# Patient Record
Sex: MALE | Race: WHITE | Employment: FULL TIME | URBAN - METROPOLITAN AREA
[De-identification: names, ages, dates, MRNs, and addresses within clinical notes are randomized per-mention and may not be internally consistent; named-entity substitution may affect disease eponyms.]

---

## 2017-06-27 PROBLEM — M54.50 RIGHT-SIDED LOW BACK PAIN WITHOUT SCIATICA, UNSPECIFIED CHRONICITY: Status: ACTIVE | Noted: 2017-06-27

## 2017-10-13 PROBLEM — L65.9 ALOPECIA: Status: ACTIVE | Noted: 2017-10-13

## 2018-03-12 PROBLEM — M54.50 RIGHT-SIDED LOW BACK PAIN WITHOUT SCIATICA, UNSPECIFIED CHRONICITY: Status: RESOLVED | Noted: 2017-06-27 | Resolved: 2018-03-12

## 2018-03-27 ENCOUNTER — HOSPITAL ENCOUNTER (EMERGENCY)
Facility: HOSPITAL | Age: 38
Discharge: HOME | End: 2018-03-27
Attending: EMERGENCY MEDICINE
Payer: COMMERCIAL

## 2018-03-27 ENCOUNTER — APPOINTMENT (OUTPATIENT)
Dept: GENERAL RADIOLOGY | Facility: HOSPITAL | Age: 38
End: 2018-03-27
Attending: EMERGENCY MEDICINE
Payer: COMMERCIAL

## 2018-03-27 VITALS
OXYGEN SATURATION: 96 % | DIASTOLIC BLOOD PRESSURE: 72 MMHG | RESPIRATION RATE: 20 BRPM | SYSTOLIC BLOOD PRESSURE: 108 MMHG | WEIGHT: 180 LBS | HEART RATE: 86 BPM | HEIGHT: 73 IN | TEMPERATURE: 98 F | BODY MASS INDEX: 23.86 KG/M2

## 2018-03-27 DIAGNOSIS — B34.9 VIRAL SYNDROME: Primary | ICD-10-CM

## 2018-03-27 LAB
ANION GAP SERPL CALC-SCNC: 10 MMOL/L (ref 0–18)
BASOPHILS # BLD: 0 K/UL (ref 0–0.2)
BASOPHILS NFR BLD: 0 %
BUN SERPL-MCNC: 21 MG/DL (ref 8–20)
BUN/CREAT SERPL: 16.8 (ref 10–20)
CALCIUM SERPL-MCNC: 9.3 MG/DL (ref 8.5–10.5)
CHLORIDE SERPL-SCNC: 96 MMOL/L (ref 95–110)
CO2 SERPL-SCNC: 28 MMOL/L (ref 22–32)
CREAT SERPL-MCNC: 1.25 MG/DL (ref 0.5–1.5)
EOSINOPHIL # BLD: 0.1 K/UL (ref 0–0.7)
EOSINOPHIL NFR BLD: 1 %
ERYTHROCYTE [DISTWIDTH] IN BLOOD BY AUTOMATED COUNT: 12.5 % (ref 11–15)
GLUCOSE SERPL-MCNC: 129 MG/DL (ref 70–99)
HCT VFR BLD AUTO: 46.2 % (ref 41–52)
HGB BLD-MCNC: 16.1 G/DL (ref 13.5–17.5)
LYMPHOCYTES # BLD: 0.8 K/UL (ref 1–4)
LYMPHOCYTES NFR BLD: 11 %
MCH RBC QN AUTO: 32.5 PG (ref 27–32)
MCHC RBC AUTO-ENTMCNC: 34.8 G/DL (ref 32–37)
MCV RBC AUTO: 93.3 FL (ref 80–100)
MONOCYTES # BLD: 0.4 K/UL (ref 0–1)
MONOCYTES NFR BLD: 5 %
NEUTROPHILS # BLD AUTO: 6.1 K/UL (ref 1.8–7.7)
NEUTROPHILS NFR BLD: 83 %
OSMOLALITY UR CALC.SUM OF ELEC: 283 MOSM/KG (ref 275–295)
PLATELET # BLD AUTO: 164 K/UL (ref 140–400)
PMV BLD AUTO: 10.1 FL (ref 7.4–10.3)
POTASSIUM SERPL-SCNC: 4.2 MMOL/L (ref 3.3–5.1)
RBC # BLD AUTO: 4.95 M/UL (ref 4.5–5.9)
SODIUM SERPL-SCNC: 134 MMOL/L (ref 136–144)
WBC # BLD AUTO: 7.4 K/UL (ref 4–11)

## 2018-03-27 PROCEDURE — 80048 BASIC METABOLIC PNL TOTAL CA: CPT

## 2018-03-27 PROCEDURE — 99284 EMERGENCY DEPT VISIT MOD MDM: CPT

## 2018-03-27 PROCEDURE — 71046 X-RAY EXAM CHEST 2 VIEWS: CPT | Performed by: EMERGENCY MEDICINE

## 2018-03-27 PROCEDURE — 85025 COMPLETE CBC W/AUTO DIFF WBC: CPT | Performed by: EMERGENCY MEDICINE

## 2018-03-27 PROCEDURE — 96360 HYDRATION IV INFUSION INIT: CPT

## 2018-03-27 PROCEDURE — 80048 BASIC METABOLIC PNL TOTAL CA: CPT | Performed by: EMERGENCY MEDICINE

## 2018-03-27 PROCEDURE — 85025 COMPLETE CBC W/AUTO DIFF WBC: CPT

## 2018-03-27 RX ORDER — ONDANSETRON 4 MG/1
4 TABLET, ORALLY DISINTEGRATING ORAL EVERY 4 HOURS PRN
Qty: 10 TABLET | Refills: 0 | Status: SHIPPED | OUTPATIENT
Start: 2018-03-27 | End: 2018-04-03

## 2018-03-27 NOTE — ED PROVIDER NOTES
Patient Seen in: [facility] AND [facility] Emergency Department    History   Patient presents with:  Abdomen/Flank Pain (GI/)    Stated Complaint: Abdominal Pain    HPI    61-year-old male with no significant past medical history here with complaints of epigas light-headedness and headaches. All other systems reviewed and are negative. Positive for stated complaint: Abdominal Pain  Other systems are as noted in HPI. Constitutional and vital signs reviewed.       All other systems reviewed and negative exc Sodium 134 (L) 136 - 144 mmol/L   Potassium 4.2 3.3 - 5.1 mmol/L   Chloride 96 95 - 110 mmol/L   CO2 28 22 - 32 mmol/L   BUN 21 (H) 8 - 20 mg/dL   Creatinine 1.25 0.50 - 1.50 mg/dL   Calcium, Total 9.3 8.5 - 10.5 mg/dL   BUN/CREA Ratio 16.8 10.0 - 20.0 records for any recent pertinent discharge summaries, testing, and procedures, and reviewed those reports. Complicating Factors: The patient already has has Alopecia on his problem list. to contribute to the complexity of his ED evaluation.     - pt  com

## 2018-03-27 NOTE — ED NOTES
Pt verbalized having fevers for 4 days. +cough with brownish mucous per pt. Pt was taking ibuprofen and tylenol. At 4:30 pt was woken up by a sharp epigastric pain. + diarrhea since yesterday(5-8 epsidoes). Denied vomiting. + nausea.  Denied recent traveling

## 2018-06-29 PROBLEM — Z80.8 FAMILY HISTORY OF MALIGNANT MELANOMA: Status: ACTIVE | Noted: 2018-06-29

## 2024-08-09 ENCOUNTER — TELEPHONE (OUTPATIENT)
Dept: INTERNAL MEDICINE CLINIC | Facility: CLINIC | Age: 44
End: 2024-08-09

## 2024-08-09 NOTE — TELEPHONE ENCOUNTER
Called patient who is on Dr. Reji Nogueira's GAP list. The patient said he doesn't know Dr. Nogueira nor does he know the retired PCP Zahra Unger MD as well

## 2024-08-19 ENCOUNTER — PATIENT OUTREACH (OUTPATIENT)
Dept: CASE MANAGEMENT | Age: 44
End: 2024-08-19

## 2024-08-19 NOTE — PROCEDURES
Received order for PCP removal request and reason for removal \"Patient on Dr. Nogueira's care gap list. Patient never seen Dr. Nogueira\".      Dr. Reji Nogueira is NOT listed as the patient's PCP to remove.   PCP field is listed as \"Cat Unger MD

## (undated) NOTE — ED AVS SNAPSHOT
[facility]   MRN: [id number]    Department:  [facility]   Date of Visit:  3/27/2018           Disclosure     Insurance plans vary and the physician(s) referred by the ER may not be covered by your plan.  Please co CARE PHYSICIAN AT ONCE OR RETURN IMMEDIATELY TO THE EMERGENCY DEPARTMENT. If you have been prescribed any medication(s), please fill your prescription right away and begin taking the medication(s) as directed.   If you believe that any of the medications